# Patient Record
Sex: FEMALE | URBAN - METROPOLITAN AREA
[De-identification: names, ages, dates, MRNs, and addresses within clinical notes are randomized per-mention and may not be internally consistent; named-entity substitution may affect disease eponyms.]

---

## 2018-05-23 ENCOUNTER — APPOINTMENT (RX ONLY)
Dept: URBAN - METROPOLITAN AREA CLINIC 53 | Facility: CLINIC | Age: 53
Setting detail: DERMATOLOGY
End: 2018-05-23

## 2018-05-23 DIAGNOSIS — L81.4 OTHER MELANIN HYPERPIGMENTATION: ICD-10-CM

## 2018-05-23 PROCEDURE — ? PRODUCT LINE (OBAGI)

## 2018-05-23 PROCEDURE — 99202 OFFICE O/P NEW SF 15 MIN: CPT

## 2018-05-23 PROCEDURE — ? COUNSELING

## 2018-05-23 PROCEDURE — ? PRESCRIPTION SAMPLES PROVIDED

## 2018-05-23 PROCEDURE — ? PRESCRIPTION

## 2018-05-23 RX ORDER — TRETINOIN 0.5 MG/G
CREAM TOPICAL
Qty: 1 | Refills: 2 | Status: ERX | COMMUNITY
Start: 2018-05-23

## 2018-05-23 RX ORDER — HYDROQUINONE 40 MG/G
CREAM TOPICAL
Qty: 1 | Refills: 2 | Status: ERX | COMMUNITY
Start: 2018-05-23

## 2018-05-23 RX ADMIN — HYDROQUINONE: 40 CREAM TOPICAL at 20:47

## 2018-05-23 RX ADMIN — TRETINOIN: 0.5 CREAM TOPICAL at 20:47

## 2018-05-23 ASSESSMENT — LOCATION ZONE DERM: LOCATION ZONE: FACE

## 2018-05-23 ASSESSMENT — LOCATION SIMPLE DESCRIPTION DERM: LOCATION SIMPLE: RIGHT CHEEK

## 2018-05-23 ASSESSMENT — LOCATION DETAILED DESCRIPTION DERM: LOCATION DETAILED: RIGHT INFERIOR CENTRAL MALAR CHEEK

## 2018-05-23 NOTE — PROCEDURE: COUNSELING
Patient Specific Counseling (Will Not Stick From Patient To Patient): Patient will purchase Obagi if prescriptions too expensive
Detail Level: Zone

## 2018-05-23 NOTE — PROCEDURE: PRODUCT LINE (OBAGI)
Product 39 Price (In Dollars - Numeric Only, No Special Characters Or $): 0.00
Product 34 Units: 0
Name Of Product 4: Tretinoin .05% cream
Product 1 Price (In Dollars - Numeric Only, No Special Characters Or $): 45
Product 7 Price (In Dollars - Numeric Only, No Special Characters Or $): 128
Detail Level: Zone
Product 5 Application Directions: Follow steps as instructed
Name Of Product 3: Obagi 
Name Of Product 1: Obagi Tretinoin .1%
Product 5 Price (In Dollars - Numeric Only, No Special Characters Or $): 299.20
Name Of Product 6: Obagi warm tinted sunscreen 50 SPF
Product 3 Application Directions: Apply nightly blended with Tretinoin.
Name Of Product 5: Obagi Vitamin C kit
Name Of Product 7: Obagi elastiderm eye cream
Render Product Pricing In Note: No
Product 6 Application Directions: Apply daily
Product 6 Price (In Dollars - Numeric Only, No Special Characters Or $): 59.23
Product 4 Units: 1
Product 1 Application Directions: Apply every other night, increase to nightly as tolerated
Product 2 Price (In Dollars - Numeric Only, No Special Characters Or $): 123
Name Of Product 2: Obagi clarifying serum
Product 3 Price (In Dollars - Numeric Only, No Special Characters Or $): 103
Product 2 Application Directions: Apply every am for pigmentation.
Product 4 Application Directions: Apply every other night, increase as tolerated